# Patient Record
Sex: FEMALE
[De-identification: names, ages, dates, MRNs, and addresses within clinical notes are randomized per-mention and may not be internally consistent; named-entity substitution may affect disease eponyms.]

---

## 2017-06-20 ENCOUNTER — APPOINTMENT (OUTPATIENT)
Dept: DERMATOLOGY | Facility: CLINIC | Age: 52
End: 2017-06-20

## 2017-06-20 VITALS — BODY MASS INDEX: 20.14 KG/M2 | HEIGHT: 64 IN | WEIGHT: 118 LBS

## 2017-06-20 DIAGNOSIS — Z84.0 FAMILY HISTORY OF DISEASES OF THE SKIN AND SUBCUTANEOUS TISSUE: ICD-10-CM

## 2017-06-20 DIAGNOSIS — D48.9 NEOPLASM OF UNCERTAIN BEHAVIOR, UNSPECIFIED: ICD-10-CM

## 2017-06-23 LAB — CORE LAB BIOPSY: NORMAL

## 2017-07-05 ENCOUNTER — APPOINTMENT (OUTPATIENT)
Dept: DERMATOLOGY | Facility: CLINIC | Age: 52
End: 2017-07-05

## 2020-07-22 ENCOUNTER — TRANSCRIPTION ENCOUNTER (OUTPATIENT)
Age: 55
End: 2020-07-22

## 2023-08-23 ENCOUNTER — APPOINTMENT (RX ONLY)
Dept: URBAN - METROPOLITAN AREA CLINIC 124 | Facility: CLINIC | Age: 58
Setting detail: DERMATOLOGY
End: 2023-08-23

## 2023-08-23 DIAGNOSIS — L82.1 OTHER SEBORRHEIC KERATOSIS: ICD-10-CM

## 2023-08-23 DIAGNOSIS — D22 MELANOCYTIC NEVI: ICD-10-CM

## 2023-08-23 DIAGNOSIS — D18.0 HEMANGIOMA: ICD-10-CM

## 2023-08-23 DIAGNOSIS — Z71.89 OTHER SPECIFIED COUNSELING: ICD-10-CM

## 2023-08-23 DIAGNOSIS — L81.4 OTHER MELANIN HYPERPIGMENTATION: ICD-10-CM

## 2023-08-23 PROBLEM — D18.01 HEMANGIOMA OF SKIN AND SUBCUTANEOUS TISSUE: Status: ACTIVE | Noted: 2023-08-23

## 2023-08-23 PROBLEM — D22.5 MELANOCYTIC NEVI OF TRUNK: Status: ACTIVE | Noted: 2023-08-23

## 2023-08-23 PROCEDURE — 99203 OFFICE O/P NEW LOW 30 MIN: CPT

## 2023-08-23 PROCEDURE — ? COUNSELING

## 2023-08-23 ASSESSMENT — LOCATION SIMPLE DESCRIPTION DERM
LOCATION SIMPLE: ABDOMEN
LOCATION SIMPLE: LEFT TEMPLE
LOCATION SIMPLE: RIGHT THIGH
LOCATION SIMPLE: CHEST
LOCATION SIMPLE: RIGHT LOWER BACK
LOCATION SIMPLE: LEFT UPPER BACK

## 2023-08-23 ASSESSMENT — LOCATION DETAILED DESCRIPTION DERM
LOCATION DETAILED: LEFT INFERIOR UPPER BACK
LOCATION DETAILED: MIDDLE STERNUM
LOCATION DETAILED: LEFT LATERAL TEMPLE
LOCATION DETAILED: EPIGASTRIC SKIN
LOCATION DETAILED: RIGHT INFERIOR LATERAL MIDBACK
LOCATION DETAILED: RIGHT ANTERIOR PROXIMAL THIGH

## 2023-08-23 ASSESSMENT — LOCATION ZONE DERM
LOCATION ZONE: LEG
LOCATION ZONE: FACE
LOCATION ZONE: TRUNK

## 2024-07-15 ENCOUNTER — APPOINTMENT (RX ONLY)
Dept: URBAN - METROPOLITAN AREA CLINIC 124 | Facility: CLINIC | Age: 59
Setting detail: DERMATOLOGY
End: 2024-07-15

## 2024-07-15 DIAGNOSIS — Z41.9 ENCOUNTER FOR PROCEDURE FOR PURPOSES OTHER THAN REMEDYING HEALTH STATE, UNSPECIFIED: ICD-10-CM

## 2024-07-15 PROCEDURE — ? ADDITIONAL NOTES

## 2024-07-15 NOTE — PROCEDURE: ADDITIONAL NOTES
Render Risk Assessment In Note?: no
Additional Notes: Pt came into the office to inquire about treatment for the veins starting to show in her legs. Dr. Snow advised the pt to have a consultation with Dr. Green for txt. Pt is scheduled.
Detail Level: Simple

## 2024-07-30 ENCOUNTER — APPOINTMENT (RX ONLY)
Dept: URBAN - METROPOLITAN AREA CLINIC 125 | Facility: CLINIC | Age: 59
Setting detail: DERMATOLOGY
End: 2024-07-30

## 2024-07-30 DIAGNOSIS — I83.9 ASYMPTOMATIC VARICOSE VEINS OF LOWER EXTREMITIES: ICD-10-CM | Status: WORSENING

## 2024-07-30 PROBLEM — I83.90 ASYMPTOMATIC VARICOSE VEINS OF UNSPECIFIED LOWER EXTREMITY: Status: ACTIVE | Noted: 2024-07-30

## 2024-07-30 PROCEDURE — ? COUNSELING

## 2024-07-30 PROCEDURE — ? RECOMMENDATIONS

## 2024-07-30 PROCEDURE — 99212 OFFICE O/P EST SF 10 MIN: CPT | Mod: NC

## 2024-07-30 NOTE — PROCEDURE: RECOMMENDATIONS
Render Risk Assessment In Note?: no
Detail Level: Zone
Recommendation Preamble: The following recommendations were made during the visit:
Patient Management Risk Assessment: Moderate
Recommendations (Free Text): Patient needs to schedule a separate apt with Jacquie for a cosmetic consultation laser treatment for spider veins.

## 2024-08-09 ENCOUNTER — APPOINTMENT (RX ONLY)
Dept: URBAN - METROPOLITAN AREA CLINIC 126 | Facility: CLINIC | Age: 59
Setting detail: DERMATOLOGY
End: 2024-08-09

## 2024-08-09 DIAGNOSIS — Z41.9 ENCOUNTER FOR PROCEDURE FOR PURPOSES OTHER THAN REMEDYING HEALTH STATE, UNSPECIFIED: ICD-10-CM

## 2024-08-09 PROCEDURE — ? VBEAM PERFECTA LASER

## 2024-08-09 ASSESSMENT — LOCATION DETAILED DESCRIPTION DERM: LOCATION DETAILED: RIGHT ANTERIOR PROXIMAL THIGH

## 2024-08-09 ASSESSMENT — LOCATION SIMPLE DESCRIPTION DERM: LOCATION SIMPLE: RIGHT THIGH

## 2024-08-09 ASSESSMENT — LOCATION ZONE DERM: LOCATION ZONE: LEG

## 2024-08-09 NOTE — PROCEDURE: VBEAM PERFECTA LASER
Is There A Third Setting?: no
Pulse Duration: 10 ms
Delay Time (Ms): 0
Post-Procedure: Following the procedure the post care instructions were reviewed with the patient.
Consent: Written consent obtained.  Risks were reviewed including but not limited to crusting, scabbing, blistering, scarring, darker or lighter pigmentary change, bruising, and/or incomplete response.
Location: right thigh
Price (Use Numbers Only, No Special Characters Or $): 50
Spot Size: 12 mm
Treatment Number: 1
Post-Care Instructions: I reviewed with the patient in detail post-care instructions.  Cool compresses or cold gel packs may be applied after treatment.  Exposure to the sun should be avoided and sun protection and sunscreen should be used.
Fluence (J/Cm2): 11
Pre-Procedure: The treatment areas identified by the patient were cleansed and treatment was performed with the parameters mentioned above.
Detail Level: Zone